# Patient Record
(demographics unavailable — no encounter records)

---

## 2018-10-01 NOTE — ER DOCUMENT REPORT
HPI





- HPI


Patient complains to provider of: left neck/arm pain


Pain Level: 5


Context: 





Pt. stated that last night she woke up around 2230 with dull pain in her left 

shoulder moving down to her left hand. She stated that the pain was 10/10 at 

the time of onset, she did not take any medications for the pain. Stated that 

in the ER we have her a heat pack which has helped the pain. Pain is more in 

her trapezius muscle and moves down her posterior left arm. Pt. denies trauma 

or any recent working out or lifting activities. Denies chest pain, shortness 

of breath, nausea, vomiting, headache, fever, or abd pain. 





[place]


Meds: none


PMH: none


Allergies: none





Admits to smoking but denies etoh use or illicit drug use.





- EENT


EENT: DENIES: Sore Throat, Ear Pain, Eye problems





- NEURO


Neurology: DENIES: Headache, Weakness, Vision blurred, Dizzinesss / Vertigo





- CARDIOVASCULAR


Cardiovascular: DENIES: Chest pain





- RESPIRATORY


Respiratory: DENIES: Trouble Breathing, Coughing





- GASTROINTESTINAL


Gastrointestinal: DENIES: Abdominal Pain, Black / Bloody Stools





- URINARY


Urinary: DENIES: Dysuria, Urgency, Frequency





- MUSCULOSKELETAL


Musculoskeletal: DENIES: Extremity pain





Past Medical History





- General


Information source: Patient





- Social History


Smoking Status: Unknown if Ever Smoked


Lives with: Family


Family History: Reviewed & Not Pertinent


Patient has suicidal ideation: No


Patient has homicidal ideation: No


Renal/ Medical History: Denies: Hx Peritoneal Dialysis





Vertical Provider Document





- CONSTITUTIONAL


Notes: 





GENERAL: Alert, interacts well. No acute distress.


HEAD: Normocephalic, atraumatic.


EYES: Pupils equal, round, and reactive to light. Extraocular movements intact.


ENT: Oral mucosa moist, tongue midline. 


NECK: Full range of motion. Supple. Trachea midline. pain left trapezius muscle 

more so on palpation and movement of shoulder.


LUNGS: Clear to auscultation bilaterally, no wheezes, rales, or rhonchi. No 

respiratory distress.


HEART: Regular rate and rhythm. No murmur


ABDOMEN: Soft, non-tender. Non-distended. Bowel sounds present in all 4 

quadrants.


EXTREMITIES: Moves all 4 extremities spontaneously. No edema, normal radial and 

dorsalis pedis pulses bilaterally. No cyanosis. +PMS BL UE and LE (all equal)


BACK: no cervical, thoracic, lumbar midline tenderness. No saddle anesthesia, 

normal distal neurovascular exam. 


NEUROLOGICAL: Alert and oriented x3. Normal speech. 


PSYCH: Normal affect, normal mood.


SKIN: Warm, dry, normal turgor. No rashes or lesions noted.





Radial, ulnar, median nerves intact with no deficits BL UE.








- INFECTION CONTROL


TRAVEL OUTSIDE OF THE [place] IN LAST 30 DAYS: No





Course





- Re-evaluation


Re-evalutation: 


Patient is alone in the ER and driving, which is why Flexeril was given as a 

home prescription.


Discussed with patient treatment modalities at home.  No need for any imaging 

due to the discomfort not being cervical point tenderness.  Patient states [place] 

[place] follow up with in the next 24

-48 hours.  Discussed with her potential need for MRI should pain increase and 

or become a numbness or tingling sensation.


Return precautions given.














- Vital Signs


Vital signs: 


 











Temp Pulse Resp BP Pulse Ox


 


 97.8 F   91   14   115/76   98 


 


 10/01/18 05:06  10/01/18 05:06  10/01/18 05:06  10/01/18 05:06  10/01/18 05:06














Discharge





- Discharge


Clinical Impression: 


 Muscle pain





Condition: Stable


Disposition: HOME, SELF-CARE


Additional Instructions: 


As we discussed your signs and symptoms are consistent with a muscle spasm.  

Due to you not having any cervical spine tenderness no imaging is warranted at 

this time please follow-up with your primary care provider for continued 

treatment.  As we discussed you should use heat and exercise to loosen up the 

muscles.  Take medications as prescribed.  Return to the emergency room with 

increased pain, persistent numbness or tingling in the left hand or any other 

concerning symptoms.








Muscle Relaxers





     Muscle relaxing medications are usually prescribed for acute muscle spasm 

or injury to the neck and back.  They are often combined with antiinflammatory 

pain medication for increased relief.


     You may stop the muscle relaxer when the pain and stiffness have improved.

  Start the medication again if spasms recur.  


     Muscle relaxers may cause drowsiness, especially with the first dose.  Do 

not operate machinery or drive while under the effects of the medication.  Most 

muscle relaxers last up to 24 hours.  Do not combine the medication with 

alcohol.


Prescriptions: 


Ketorolac Tromethamine [Toradol 10 mg Tablet] 10 mg PO Q6HP PRN #24 tablet


 PRN Reason: Pain Scale Of 1


Cyclobenzaprine HCl [Flexeril 10 mg Tablet] 10 mg PO TIDP PRN #15 tab


 PRN Reason: 


Referrals: 


NEMO JUDD,  [Primary Care Provider] - Follow up as needed

## 2019-10-29 NOTE — ER DOCUMENT REPORT
HPI





- HPI


Time Seen by Provider: 10/29/19 18:14


Pain Level: 4


Context: 





Patient is a 28-year-old female presents emergency department with a chief 

complaint of a laceration to her right fifth digit.  Patient is right-handed.  

She was carving a pumpkin and ended up cutting her anterior portion of her 

finger at the MIP joint.  Patient is able to flex and extend her digits.  She is

up-to-date on her immunizations.





- MUSCULOSKELETAL


Musculoskeletal: REPORTS: Extremity pain - right 5th finger





- DERM


Skin Problems: Laceration - right 5th finger; 1 cm





Past Medical History





- Social History


Smoking Status: Never Smoker


Frequency of alcohol use: None


Drug Abuse: None


Family History: Reviewed & Not Pertinent


Patient has suicidal ideation: No


Patient has homicidal ideation: No


Renal/ Medical History: Denies: Hx Peritoneal Dialysis


Past Surgical History: Reports: Hx Hysterectomy





Vertical Provider Document





- CONSTITUTIONAL


Agree With Documented VS: Yes


Exam Limitations: No Limitations


General Appearance: No Apparent Distress





- INFECTION CONTROL


TRAVEL OUTSIDE OF THE [place] IN LAST 30 DAYS: No





- HEENT


HEENT: Atraumatic, Normocephalic





- RESPIRATORY


Respiratory: No Respiratory Distress





- CARDIOVASCULAR


Cardiovascular: Regular Rate


Pulses: Normal: Radial





- MUSCULOSKELETAL/EXTREMETIES


Musculoskeletal/Extremeties: FROM, Tender - Right fifth finger





- NEURO


Level of Consciousness: Awake, Alert, Appropriate


Motor/Sensory: No Motor Deficit, No Sensory Deficit





- DERM


Integumentary: Warm, Dry, No Rash, Laceration - Right fifth finger at MIP joint 

about 1 cm





Course





- Re-evaluation


Re-evalutation: 





10/29/19 


Differential diagnosis includes but is not limited to: Laceration, foreign body,

arterial injury, nerve injury, fracture or tendon injury.  Patient was able to 

flex and extend her digits against resistance distal to the laceration with no 

apparent tendon injury, CMS intact distal to the injury with no evidence of 

nerve damage, bleeding was well-controlled in the emergency department.  X-ray 

was not indicated.  Wound was repaired.  See procedure note.  Follow-up 

precautions were given.  Verbal discharge instructions were given to the 

patient.  They verbalized understanding.  They are stable for discharge.





Documentation was completed using voice recognition software, therefore there 

may be some unintended grammatical or punctual errors.








- Vital Signs


Vital signs: 


                                        











Temp Pulse Resp BP Pulse Ox


 


 98.7 F   88   16   129/65 H  99 


 


 10/29/19 17:49  10/29/19 17:49  10/29/19 17:49  10/29/19 17:49  10/29/19 17:49














Procedures





- Laceration/Wound Repair


  ** Right 5th digit


Wound length (cm): 1


Wound's Depth, Shape: Superficial, Linear


Laceration pre-procedure: Sterile PPE donned


Anesthetic type: 1% Lidocaine


Volume Anesthetic (mLs): 6 - digital block


Wound explored: Clean


Wound Repaired With: Sutures


Suture Size/Type: 5:0, Nylon


Number of Sutures: 2 - Simple interrupted


Post-procedure wound care: Sterile dressing applied, Splint applied - finger


Post-procedure NV exam normal: Yes


Complications: No





Discharge





- Discharge


Clinical Impression: 


Finger laceration


Qualifiers:


 Encounter type: initial encounter Finger: ring finger Damage to nail status: 

without damage Foreign body presence: without foreign body Laterality: right 

Qualified Code(s): S61.214A - Laceration without foreign body of right ring 

finger without damage to nail, initial encounter





Condition: Stable


Disposition: HOME, SELF-CARE


Instructions:  Laceration Care ([facility]), Prophylactic Antibiotic ([facility]), Soap 

Cleansing ([facility])


Additional Instructions: 


Please return to your primary doctor, the ED, or an urgent care in 7-10 days for

suture removal. Return immediately if you develop spreading redness around the 

wound, pus from the wound, worsening pain, or a fever of >100.4. Keep the area 

clean and dry. Wash gently with soap and water twice daily and cover with 

antibiotic ointment.  You are being started on antibiotics to prevent infection.


Prescriptions: 


Cephalexin Monohydrate [Keflex 500 mg Capsule] 500 mg PO Q6H 5 Days #20 capsule